# Patient Record
Sex: FEMALE | URBAN - METROPOLITAN AREA
[De-identification: names, ages, dates, MRNs, and addresses within clinical notes are randomized per-mention and may not be internally consistent; named-entity substitution may affect disease eponyms.]

---

## 2020-11-04 ENCOUNTER — OFFICE VISIT (OUTPATIENT)
Dept: URBAN - METROPOLITAN AREA CLINIC 92 | Facility: CLINIC | Age: 40
End: 2020-11-04

## 2020-11-13 ENCOUNTER — LAB OUTSIDE AN ENCOUNTER (OUTPATIENT)
Dept: URBAN - METROPOLITAN AREA CLINIC 92 | Facility: CLINIC | Age: 40
End: 2020-11-13

## 2020-11-13 ENCOUNTER — OFFICE VISIT (OUTPATIENT)
Dept: URBAN - METROPOLITAN AREA CLINIC 92 | Facility: CLINIC | Age: 40
End: 2020-11-13
Payer: COMMERCIAL

## 2020-11-13 DIAGNOSIS — B17.10 HEPATITIS C: ICD-10-CM

## 2020-11-13 PROCEDURE — 1036F TOBACCO NON-USER: CPT | Performed by: INTERNAL MEDICINE

## 2020-11-13 PROCEDURE — G8482 FLU IMMUNIZE ORDER/ADMIN: HCPCS | Performed by: INTERNAL MEDICINE

## 2020-11-13 PROCEDURE — G9903 PT SCRN TBCO ID AS NON USER: HCPCS | Performed by: INTERNAL MEDICINE

## 2020-11-13 PROCEDURE — G8417 CALC BMI ABV UP PARAM F/U: HCPCS | Performed by: INTERNAL MEDICINE

## 2020-11-13 PROCEDURE — G8427 DOCREV CUR MEDS BY ELIG CLIN: HCPCS | Performed by: INTERNAL MEDICINE

## 2020-11-13 PROCEDURE — 80074 ACUTE HEPATITIS PANEL: CPT | Performed by: INTERNAL MEDICINE

## 2020-11-13 PROCEDURE — 99204 OFFICE O/P NEW MOD 45 MIN: CPT | Performed by: INTERNAL MEDICINE

## 2020-11-13 RX ORDER — TRAZODONE HYDROCHLORIDE 100 MG/1
1 TABLET AT BEDTIME TABLET, FILM COATED ORAL ONCE A DAY
Status: ACTIVE | COMMUNITY

## 2020-11-13 NOTE — PHYSICAL EXAM GASTROINTESTINAL
Abdomen, soft, nontender, nondistended, no guarding or rigidity, no masses palpable, normal bowel sounds, Liver and Spleen, no hepatomegaly present, no hepatosplenomegaly, liver nontender, spleen not palpable, Self Exam: Abdomen soft, non-tender to palpatation, non-distended , Abdomen, soft, nontender, nondistended, no guarding or rigidity, no masses palpable, normal bowel sounds, Liver and Spleen, no hepatomegaly present, no hepatosplenomegaly, liver nontender, spleen not palpable, Self Exam: Abdomen soft, non-tender to palpatation, non-distended

## 2020-11-13 NOTE — HPI-OTHER HISTORIES
This is a 40-year-old female who was referred for evaluation of chronic  HCV.  She was recently diagnosed with chronic HCV.  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  There is no family history of chronic liver disease and she denies any prior transfusion prior to 1980.  Unfortunately, her records are not available at this time.  She denies any prior treatment or liver biopsy.    The patient is asymptomatic and denies any abdominal pain, N/V, changes in bowel habits.  There are no symptoms of hematemesis, melena, hematochezia, or constitutional symptoms including unintentional weight loss.  There is no family history of colonic adenoma or colorectal malignancy.

## 2020-11-13 NOTE — PHYSICAL EXAM EXTREMITIES:
no edema , normal , full range of motion , no clubbing, cyanosis, or edema , no edema , normal , full range of motion , no clubbing, cyanosis, or edema

## 2020-11-18 LAB
HBSAG SCREEN: NEGATIVE
HEP A AB, IGM: NEGATIVE
HEP B CORE AB, IGM: NEGATIVE
HEP C VIRUS AB: >11
HEPATITIS C GENOTYPE: (no result)
Lab: (no result)

## 2020-11-20 ENCOUNTER — OFFICE VISIT (OUTPATIENT)
Dept: URBAN - METROPOLITAN AREA CLINIC 91 | Facility: CLINIC | Age: 40
End: 2020-11-20
Payer: COMMERCIAL

## 2020-11-20 DIAGNOSIS — B18.2 CARRIER OF VIRAL HEPATITIS C: ICD-10-CM

## 2020-11-20 DIAGNOSIS — K76.9 HEPATIC LESION: ICD-10-CM

## 2020-11-20 PROCEDURE — 76700 US EXAM ABDOM COMPLETE: CPT | Performed by: INTERNAL MEDICINE

## 2020-11-23 ENCOUNTER — TELEPHONE ENCOUNTER (OUTPATIENT)
Dept: URBAN - METROPOLITAN AREA CLINIC 92 | Facility: CLINIC | Age: 40
End: 2020-11-23

## 2020-12-18 ENCOUNTER — OFFICE VISIT (OUTPATIENT)
Dept: URBAN - METROPOLITAN AREA CLINIC 92 | Facility: CLINIC | Age: 40
End: 2020-12-18

## 2021-01-29 ENCOUNTER — WEB ENCOUNTER (OUTPATIENT)
Dept: URBAN - METROPOLITAN AREA CLINIC 92 | Facility: CLINIC | Age: 41
End: 2021-01-29

## 2021-02-19 ENCOUNTER — OFFICE VISIT (OUTPATIENT)
Dept: URBAN - METROPOLITAN AREA CLINIC 92 | Facility: CLINIC | Age: 41
End: 2021-02-19
Payer: COMMERCIAL

## 2021-02-19 DIAGNOSIS — B17.10 HEPATITIS C: ICD-10-CM

## 2021-02-19 DIAGNOSIS — R93.5 ABNORMAL ABDOMINAL ULTRASOUND: ICD-10-CM

## 2021-02-19 PROCEDURE — 99214 OFFICE O/P EST MOD 30 MIN: CPT | Performed by: INTERNAL MEDICINE

## 2021-02-19 PROCEDURE — G8427 DOCREV CUR MEDS BY ELIG CLIN: HCPCS | Performed by: INTERNAL MEDICINE

## 2021-02-19 PROCEDURE — G8482 FLU IMMUNIZE ORDER/ADMIN: HCPCS | Performed by: INTERNAL MEDICINE

## 2021-02-19 PROCEDURE — 1036F TOBACCO NON-USER: CPT | Performed by: INTERNAL MEDICINE

## 2021-02-19 PROCEDURE — G8417 CALC BMI ABV UP PARAM F/U: HCPCS | Performed by: INTERNAL MEDICINE

## 2021-02-19 RX ORDER — TRAZODONE HYDROCHLORIDE 100 MG/1
1 TABLET AT BEDTIME TABLET, FILM COATED ORAL ONCE A DAY
Status: ACTIVE | COMMUNITY

## 2021-02-19 NOTE — HPI-OTHER HISTORIES
This is a 40-year-old female who was referred for evaluation of chronic  HCV.  She was recently diagnosed with chronic HCV.  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  There is no family history of chronic liver disease and she denies any prior transfusion prior to 1980.  Unfortunately, her records are not available at this time.  She denies any prior treatment or liver biopsy.    Abdominal ultrasound on 11/20/2020 showed multifocal hyperechoic hepatic lesion measuring up to 1.6 cm which were indeterminate.  Subsequent MRI/MRCP on 02/23/2021 demonstrated 2 well-circumscribed lesion in the segment 5 measuring 1.8 cm by 1.3 cm and 1.1 cm x 1 cm consistent with hemangiomas.  There was a 2 mm hyper by the/tail likely representing a side branch IPMN.  There is a left adnexal cyst measuring 4.5 cm x 3.4 cm likely physiologic.  Labs showed that she had HCV genotype 1A but PCR was not drawn   The patient is asymptomatic and denies any abdominal pain, N/V, changes in bowel habits.  There are no symptoms of hematemesis, melena, hematochezia, or constitutional symptoms including unintentional weight loss.  There is no family history of colonic adenoma or colorectal malignancy.

## 2021-02-24 LAB
ALBUMIN: 4.4
ALKALINE PHOSPHATASE: 107
ALT (SGPT): 99
AST (SGOT): 65
BILIRUBIN, DIRECT: 0.11
BILIRUBIN, TOTAL: 0.3
HCV LOG10: 5.5
HEMATOCRIT: (no result)
HEMOGLOBIN: (no result)
HEPATITIS C QUANTITATION: (no result)
INR: 1
MCH: (no result)
MCHC: (no result)
MCV: (no result)
NRBC: (no result)
PLATELETS: (no result)
PROTEIN, TOTAL: 7.6
PROTHROMBIN TIME: 10.3
RBC: (no result)
RDW: (no result)
REQUEST PROBLEM: (no result)
TEST INFORMATION:: (no result)
WBC: (no result)

## 2021-02-26 ENCOUNTER — TELEPHONE ENCOUNTER (OUTPATIENT)
Dept: URBAN - METROPOLITAN AREA CLINIC 92 | Facility: CLINIC | Age: 41
End: 2021-02-26

## 2021-03-29 ENCOUNTER — DASHBOARD ENCOUNTERS (OUTPATIENT)
Age: 41
End: 2021-03-29

## 2021-03-30 ENCOUNTER — OFFICE VISIT (OUTPATIENT)
Dept: URBAN - METROPOLITAN AREA TELEHEALTH 2 | Facility: TELEHEALTH | Age: 41
End: 2021-03-30
Payer: COMMERCIAL

## 2021-03-30 DIAGNOSIS — R93.5 ABNORMAL ABDOMINAL ULTRASOUND: ICD-10-CM

## 2021-03-30 DIAGNOSIS — B17.10 HEPATITIS C: ICD-10-CM

## 2021-03-30 PROCEDURE — 99214 OFFICE O/P EST MOD 30 MIN: CPT | Performed by: INTERNAL MEDICINE

## 2021-03-30 RX ORDER — HYDROXYZINE HYDROCHLORIDE 10 MG/1
AS DIRECTED TABLET, FILM COATED ORAL
Status: ACTIVE | COMMUNITY

## 2021-03-30 RX ORDER — TRAZODONE HYDROCHLORIDE 100 MG/1
1 TABLET AT BEDTIME TABLET, FILM COATED ORAL ONCE A DAY
Status: ACTIVE | COMMUNITY

## 2021-03-30 NOTE — HPI-OTHER HISTORIES
This is a 41-year-old female who was referred for evaluation of chronic  HCV.   She was recently diagnosed with chronic HCV.  She previously used IV heroin in 2016 until 8 months ago. She is now in a sober living facility. There is no family history of chronic liver disease and she denies any prior transfusion prior to 1980.  Unfortunately, her records are not available at this time.  She denies any prior treatment or liver biopsy.    Abdominal ultrasound on 11/20/2020 showed multifocal hyperechoic hepatic lesion measuring up to 1.6 cm which were indeterminate.  Subsequent MRI/MRCP on 02/23/2021 demonstrated 2 well-circumscribed lesion in the segment 5 measuring 1.8 cm by 1.3 cm and 1.1 cm x 1 cm consistent with hemangiomas.  There was a 2 mm hyper by the/tail likely representing a side branch IPMN.  There is a left adnexal cyst measuring 4.5 cm x 3.4 cm likely physiologic.  Labs showed that she had HCV genotype 1A and follow-up labs below.   She was prescribed Mavyret 4 days ago and is tolerating this well. She denies any side effects including HA or nausea.   She denies any abdominal pain,  changes in bowel habits or GIB.  There are no symptoms of hematemesis, melena, hematochezia, or constitutional symptoms including unintentional weight loss.  There is no family history of colonic adenoma or colorectal malignancy.

## 2021-04-02 ENCOUNTER — TELEPHONE ENCOUNTER (OUTPATIENT)
Dept: URBAN - METROPOLITAN AREA CLINIC 92 | Facility: CLINIC | Age: 41
End: 2021-04-02

## 2021-05-19 PROBLEM — 50711007 HEPATITIS C: Status: ACTIVE | Noted: 2020-11-13

## 2021-05-21 ENCOUNTER — OFFICE VISIT (OUTPATIENT)
Dept: URBAN - METROPOLITAN AREA TELEHEALTH 2 | Facility: TELEHEALTH | Age: 41
End: 2021-05-21

## 2021-05-21 RX ORDER — TRAZODONE HYDROCHLORIDE 100 MG/1
1 TABLET AT BEDTIME TABLET, FILM COATED ORAL ONCE A DAY
Status: ACTIVE | COMMUNITY

## 2021-05-21 RX ORDER — HYDROXYZINE HYDROCHLORIDE 10 MG/1
AS DIRECTED TABLET, FILM COATED ORAL
Status: ACTIVE | COMMUNITY

## 2021-05-21 NOTE — HPI-OTHER HISTORIES
This is a 41-year-old female who was referred for evaluation of chronic  HCV.    She was recently diagnosed with chronic HCV.  She previously used IV heroin in 2016 until 8 months ago. She is now in a sober living facility. There is no family history of chronic liver disease and she denies any prior transfusion prior to 1980.  Unfortunately, her records are not available at this time.  She denies any prior treatment or liver biopsy.    Abdominal ultrasound on 11/20/2020 showed multifocal hyperechoic hepatic lesion measuring up to 1.6 cm which were indeterminate.  Subsequent MRI/MRCP on 02/23/2021 demonstrated 2 well-circumscribed lesion in the segment 5 measuring 1.8 cm by 1.3 cm and 1.1 cm x 1 cm consistent with hemangiomas.  There was a 2 mm hyper by the/tail likely representing a side branch IPMN.  There is a left adnexal cyst measuring 4.5 cm x 3.4 cm likely physiologic.  Labs showed that she had HCV genotype 1A and follow-up labs below.   She was prescribed Mavyret in March, completed   She had some HA but otherwise tolerated this well  She denies any abdominal pain,  changes in bowel habits or GIB.  There are no symptoms of hematemesis, melena, hematochezia, or constitutional symptoms including unintentional weight loss.  There is no family history of colonic adenoma or colorectal malignancy.

## 2023-07-19 NOTE — PHYSICAL EXAM CHEST:
breathing is unlabored without accessory muscle use. Detail Level: Zone Detail Level: Generalized Detail Level: Detailed Detail Level: Simple